# Patient Record
Sex: MALE | Race: WHITE | URBAN - METROPOLITAN AREA
[De-identification: names, ages, dates, MRNs, and addresses within clinical notes are randomized per-mention and may not be internally consistent; named-entity substitution may affect disease eponyms.]

---

## 2019-09-05 ENCOUNTER — DOCTOR'S OFFICE (OUTPATIENT)
Dept: URBAN - METROPOLITAN AREA CLINIC 125 | Facility: CLINIC | Age: 49
Setting detail: OPHTHALMOLOGY
End: 2019-09-05
Payer: COMMERCIAL

## 2019-09-05 DIAGNOSIS — H25.013: ICD-10-CM

## 2019-09-05 DIAGNOSIS — H11.123: ICD-10-CM

## 2019-09-05 DIAGNOSIS — H17.9: ICD-10-CM

## 2019-09-05 PROBLEM — H10.13 ALLERGIC CONJUNCTIVITS; BOTH EYES: Status: ACTIVE | Noted: 2019-09-05

## 2019-09-05 PROCEDURE — 92004 COMPRE OPH EXAM NEW PT 1/>: CPT | Performed by: OPHTHALMOLOGY

## 2019-09-05 ASSESSMENT — KERATOMETRY
OD_K2POWER_DIOPTERS: 45.75
OS_K2POWER_DIOPTERS: 45.50
OD_AXISANGLE_DEGREES: 176
OD_K1POWER_DIOPTERS: 44.50
OS_AXISANGLE_DEGREES: 001
OS_K1POWER_DIOPTERS: 44.75

## 2019-09-05 ASSESSMENT — REFRACTION_MANIFEST
OS_VA2: 20/
OD_VA3: 20/
OS_VA3: 20/
OS_VA1: 20/
OU_VA: 20/
OS_VA1: 20/
OS_VA2: 20/
OD_VA3: 20/
OD_VA1: 20/
OD_VA1: 20/
OD_VA2: 20/
OU_VA: 20/
OD_VA2: 20/
OS_VA3: 20/

## 2019-09-05 ASSESSMENT — AXIALLENGTH_DERIVED
OS_AL: 23.0098
OD_AL: 23.1028

## 2019-09-05 ASSESSMENT — REFRACTION_CURRENTRX
OD_OVR_VA: 20/
OS_OVR_VA: 20/

## 2019-09-05 ASSESSMENT — REFRACTION_AUTOREFRACTION
OS_SPHERE: +0.25
OS_AXIS: 028
OS_CYLINDER: -0.50
OD_SPHERE: 0.00
OD_AXIS: 145
OD_CYLINDER: -0.50

## 2019-09-05 ASSESSMENT — CONFRONTATIONAL VISUAL FIELD TEST (CVF)
OD_FINDINGS: FULL
OS_FINDINGS: FULL

## 2019-09-05 ASSESSMENT — VISUAL ACUITY
OS_BCVA: 20/20-2
OD_BCVA: 20/20

## 2019-09-05 ASSESSMENT — SPHEQUIV_DERIVED
OD_SPHEQUIV: -0.25
OS_SPHEQUIV: 0

## 2019-11-15 ENCOUNTER — APPOINTMENT (EMERGENCY)
Dept: CT IMAGING | Facility: HOSPITAL | Age: 49
End: 2019-11-15
Payer: COMMERCIAL

## 2019-11-15 ENCOUNTER — HOSPITAL ENCOUNTER (EMERGENCY)
Facility: HOSPITAL | Age: 49
Discharge: HOME | End: 2019-11-15
Attending: EMERGENCY MEDICINE | Admitting: EMERGENCY MEDICINE
Payer: COMMERCIAL

## 2019-11-15 ENCOUNTER — APPOINTMENT (EMERGENCY)
Dept: RADIOLOGY | Facility: HOSPITAL | Age: 49
End: 2019-11-15
Payer: COMMERCIAL

## 2019-11-15 VITALS
RESPIRATION RATE: 16 BRPM | DIASTOLIC BLOOD PRESSURE: 71 MMHG | OXYGEN SATURATION: 98 % | BODY MASS INDEX: 28.23 KG/M2 | SYSTOLIC BLOOD PRESSURE: 111 MMHG | TEMPERATURE: 97.8 F | HEART RATE: 71 BPM | HEIGHT: 74 IN | WEIGHT: 220 LBS

## 2019-11-15 DIAGNOSIS — R55 SYNCOPE: Primary | ICD-10-CM

## 2019-11-15 DIAGNOSIS — R31.9 HEMATURIA: ICD-10-CM

## 2019-11-15 LAB
ALBUMIN SERPL BCP-MCNC: 4.5 G/DL (ref 3.5–5.7)
ALP SERPL-CCNC: 50 U/L (ref 40–150)
ALT SERPL W P-5'-P-CCNC: 36 U/L (ref 7–52)
ANION GAP SERPL CALCULATED.3IONS-SCNC: 7 MMOL/L (ref 4–13)
AST SERPL W P-5'-P-CCNC: 22 U/L (ref 13–39)
ATRIAL RATE: 58 BPM
BACTERIA UR QL AUTO: ABNORMAL /HPF
BASOPHILS # BLD AUTO: 0 THOUSANDS/ΜL (ref 0–0.1)
BASOPHILS NFR BLD AUTO: 1 % (ref 0–2)
BILIRUB SERPL-MCNC: 0.8 MG/DL (ref 0.2–1)
BILIRUB UR QL STRIP: NEGATIVE
BUN SERPL-MCNC: 20 MG/DL (ref 7–25)
CALCIUM SERPL-MCNC: 9 MG/DL (ref 8.6–10.5)
CHLORIDE SERPL-SCNC: 104 MMOL/L (ref 98–107)
CLARITY UR: CLEAR
CO2 SERPL-SCNC: 29 MMOL/L (ref 21–31)
COLOR UR: YELLOW
CREAT SERPL-MCNC: 1.48 MG/DL (ref 0.7–1.3)
EOSINOPHIL # BLD AUTO: 0.1 THOUSAND/ΜL (ref 0–0.61)
EOSINOPHIL NFR BLD AUTO: 3 % (ref 0–5)
ERYTHROCYTE [DISTWIDTH] IN BLOOD BY AUTOMATED COUNT: 13.1 % (ref 11.5–14.5)
GFR SERPL CREATININE-BSD FRML MDRD: 56 ML/MIN/1.73SQ M
GLUCOSE SERPL-MCNC: 127 MG/DL (ref 65–99)
GLUCOSE UR STRIP-MCNC: NEGATIVE MG/DL
HCT VFR BLD AUTO: 47 % (ref 42–47)
HGB BLD-MCNC: 16.1 G/DL (ref 14–18)
HGB UR QL STRIP.AUTO: ABNORMAL
HYALINE CASTS #/AREA URNS LPF: ABNORMAL /LPF
KETONES UR STRIP-MCNC: NEGATIVE MG/DL
LEUKOCYTE ESTERASE UR QL STRIP: NEGATIVE
LIPASE SERPL-CCNC: 32 U/L (ref 11–82)
LYMPHOCYTES # BLD AUTO: 1.4 THOUSANDS/ΜL (ref 0.6–4.47)
LYMPHOCYTES NFR BLD AUTO: 35 % (ref 21–51)
MCH RBC QN AUTO: 30.8 PG (ref 26–34)
MCHC RBC AUTO-ENTMCNC: 34.3 G/DL (ref 31–37)
MCV RBC AUTO: 90 FL (ref 81–99)
MONOCYTES # BLD AUTO: 0.3 THOUSAND/ΜL (ref 0.17–1.22)
MONOCYTES NFR BLD AUTO: 8 % (ref 2–12)
MUCOUS THREADS UR QL AUTO: ABNORMAL
NEUTROPHILS # BLD AUTO: 2.1 THOUSANDS/ΜL (ref 1.4–6.5)
NEUTS SEG NFR BLD AUTO: 53 % (ref 42–75)
NITRITE UR QL STRIP: NEGATIVE
NON-SQ EPI CELLS URNS QL MICRO: ABNORMAL /HPF
P AXIS: 63 DEGREES
PH UR STRIP.AUTO: 5.5 [PH]
PLATELET # BLD AUTO: 119 THOUSANDS/UL (ref 149–390)
PMV BLD AUTO: 9.4 FL (ref 8.6–11.7)
POTASSIUM SERPL-SCNC: 4 MMOL/L (ref 3.5–5.5)
PR INTERVAL: 190 MS
PROT SERPL-MCNC: 6.4 G/DL (ref 6.4–8.9)
PROT UR STRIP-MCNC: NEGATIVE MG/DL
QRS AXIS: 58 DEGREES
QRSD INTERVAL: 92 MS
QT INTERVAL: 460 MS
QTC INTERVAL: 451 MS
RBC # BLD AUTO: 5.23 MILLION/UL (ref 4.3–5.9)
RBC #/AREA URNS AUTO: ABNORMAL /HPF
SODIUM SERPL-SCNC: 140 MMOL/L (ref 134–143)
SP GR UR STRIP.AUTO: >=1.03 (ref 1–1.03)
T WAVE AXIS: 63 DEGREES
TROPONIN I SERPL-MCNC: <0.03 NG/ML
UROBILINOGEN UR QL STRIP.AUTO: 0.2 E.U./DL
VENTRICULAR RATE: 58 BPM
WBC # BLD AUTO: 3.9 THOUSAND/UL (ref 4.8–10.8)
WBC #/AREA URNS AUTO: ABNORMAL /HPF

## 2019-11-15 PROCEDURE — 99285 EMERGENCY DEPT VISIT HI MDM: CPT

## 2019-11-15 PROCEDURE — 93010 ELECTROCARDIOGRAM REPORT: CPT | Performed by: INTERNAL MEDICINE

## 2019-11-15 PROCEDURE — 36415 COLL VENOUS BLD VENIPUNCTURE: CPT | Performed by: EMERGENCY MEDICINE

## 2019-11-15 PROCEDURE — 74176 CT ABD & PELVIS W/O CONTRAST: CPT

## 2019-11-15 PROCEDURE — 99285 EMERGENCY DEPT VISIT HI MDM: CPT | Performed by: EMERGENCY MEDICINE

## 2019-11-15 PROCEDURE — 81001 URINALYSIS AUTO W/SCOPE: CPT | Performed by: EMERGENCY MEDICINE

## 2019-11-15 PROCEDURE — 81003 URINALYSIS AUTO W/O SCOPE: CPT | Performed by: EMERGENCY MEDICINE

## 2019-11-15 PROCEDURE — 71046 X-RAY EXAM CHEST 2 VIEWS: CPT

## 2019-11-15 PROCEDURE — 84484 ASSAY OF TROPONIN QUANT: CPT | Performed by: EMERGENCY MEDICINE

## 2019-11-15 PROCEDURE — 85025 COMPLETE CBC W/AUTO DIFF WBC: CPT | Performed by: EMERGENCY MEDICINE

## 2019-11-15 PROCEDURE — 93005 ELECTROCARDIOGRAM TRACING: CPT

## 2019-11-15 PROCEDURE — 80053 COMPREHEN METABOLIC PANEL: CPT | Performed by: EMERGENCY MEDICINE

## 2019-11-15 PROCEDURE — 83690 ASSAY OF LIPASE: CPT | Performed by: EMERGENCY MEDICINE

## 2019-11-15 NOTE — ED PROVIDER NOTES
History  Chief Complaint   Patient presents with    Syncope     states woke this am with severe pain in scrotum and right back  He went to the bathroom and next he knew he woke in the bathroom on the floor  Patient is a 61-year-old male  Presents to the emergency room after an episode of syncope  He woke up this morning around 530 in the morning with pain in his right flank that radiated to his right scrotum  It was fairly severe  He went to the bathroom  While urinating he had a syncopal episode  He denies hematuria  There was no seizure  After about 5 to 10 minutes he was back to normal   He was diaphoretic  There was no injury  He was nauseated when he came to  No vomiting  No fever or chills  No chest pain, shortness of breath or palpitations  Patient does not have any cardiac history  The pain in the flank and scrotum has improved  Patient reports it is mild dull ache currently  Prior to Admission Medications   Prescriptions Last Dose Informant Patient Reported? Taking? PANTOPRAZOLE SODIUM PO   Yes Yes   Sig: Take by mouth      Facility-Administered Medications: None       Past Medical History:   Diagnosis Date    Hiatal hernia        History reviewed  No pertinent surgical history  History reviewed  No pertinent family history  I have reviewed and agree with the history as documented  Social History     Tobacco Use    Smoking status: Never Smoker    Smokeless tobacco: Never Used   Substance Use Topics    Alcohol use: Never     Frequency: Never    Drug use: Never        Review of Systems   Constitutional: Negative for chills and fever  HENT: Negative for rhinorrhea and sore throat  Eyes: Negative for pain, redness and visual disturbance  Respiratory: Negative for cough and shortness of breath  Cardiovascular: Negative for chest pain and leg swelling  Gastrointestinal: Positive for nausea  Negative for abdominal pain, diarrhea and vomiting     Endocrine: Negative for polydipsia and polyuria  Genitourinary: Positive for flank pain  Negative for dysuria, frequency and hematuria  Musculoskeletal: Positive for back pain  Negative for neck pain  Skin: Negative for rash and wound  Allergic/Immunologic: Negative for immunocompromised state  Neurological: Positive for syncope  Negative for weakness, numbness and headaches  Psychiatric/Behavioral: Negative for hallucinations and suicidal ideas  All other systems reviewed and are negative  Physical Exam  Physical Exam   Constitutional: He is oriented to person, place, and time  He appears well-developed and well-nourished  No distress  HENT:   Head: Normocephalic and atraumatic  Eyes: Pupils are equal, round, and reactive to light  Conjunctivae and EOM are normal  Right eye exhibits no discharge  Left eye exhibits no discharge  No scleral icterus  Neck: Normal range of motion  Neck supple  Cardiovascular: Normal rate, regular rhythm, normal heart sounds and intact distal pulses  Exam reveals no gallop and no friction rub  No murmur heard  Pulmonary/Chest: Effort normal and breath sounds normal  No respiratory distress  He has no wheezes  He has no rales  Abdominal: Soft  Bowel sounds are normal  He exhibits no distension  There is no tenderness  There is no rebound and no guarding  Genitourinary:   Genitourinary Comments: No hernia  No testicular tenderness  No scrotal swelling  Musculoskeletal: Normal range of motion  He exhibits no edema, tenderness or deformity  Neurological: He is alert and oriented to person, place, and time  He has normal strength  No sensory deficit  GCS eye subscore is 4  GCS verbal subscore is 5  GCS motor subscore is 6  Skin: Skin is warm and dry  No rash noted  He is not diaphoretic  Psychiatric: He has a normal mood and affect  His behavior is normal    Vitals reviewed        Vital Signs  ED Triage Vitals [11/15/19 0626]   Temperature Pulse Respirations Blood Pressure SpO2   97 8 °F (36 6 °C) 58 16 109/74 99 %      Temp src Heart Rate Source Patient Position - Orthostatic VS BP Location FiO2 (%)   -- -- -- -- --      Pain Score       No Pain           Vitals:    11/15/19 0626 11/15/19 0700 11/15/19 0730 11/15/19 1005   BP: 109/74 113/75 116/75 111/71   Pulse: 58 62 64 71         Visual Acuity      ED Medications  Medications - No data to display    Diagnostic Studies  Results Reviewed     Procedure Component Value Units Date/Time    Urine Microscopic [[id number]]  (Abnormal) Collected:  11/15/19 0857    Lab Status:  Final result Specimen:  Urine, Clean Catch Updated:  11/15/19 0917     RBC, UA 10-20 /hpf      WBC, UA 1-2 /hpf      Epithelial Cells Occasional /hpf      Bacteria, UA Occasional /hpf      Hyaline Casts, UA 2-4 /lpf      MUCUS THREADS Moderate    UA w Reflex to Microscopic w Reflex to Culture [[id number]]  (Abnormal) Collected:  11/15/19 0857    Lab Status:  Final result Specimen:  Urine, Clean Catch Updated:  11/15/19 0906     Color, UA Yellow     Clarity, UA Clear     Specific Gravity, UA >=1 030     pH, UA 5 5     Leukocytes, UA Negative     Nitrite, UA Negative     Protein, UA Negative mg/dl      Glucose, UA Negative mg/dl      Ketones, UA Negative mg/dl      Urobilinogen, UA 0 2 E U /dl      Bilirubin, UA Negative     Blood, UA 2+    Lipase [[id number]]  (Normal) Collected:  11/15/19 0647    Lab Status:  Final result Specimen:  Blood from Arm, Left Updated:  11/15/19 0848     Lipase 32 u/L     Troponin I [[id number]]  (Normal) Collected:  11/15/19 0647    Lab Status:  Final result Specimen:  Blood from Arm, Left Updated:  11/15/19 0719     Troponin I <0 03 ng/mL     Comprehensive metabolic panel [[id number]]  (Abnormal) Collected:  11/15/19 0647    Lab Status:  Final result Specimen:  Blood from Arm, Left Updated:  11/15/19 4916     Sodium 140 mmol/L      Potassium 4 0 mmol/L      Chloride 104 mmol/L      CO2 29 mmol/L      ANION GAP 7 mmol/L      BUN 20 mg/dL      Creatinine 1 48 mg/dL      Glucose 127 mg/dL      Calcium 9 0 mg/dL      AST 22 U/L      ALT 36 U/L      Alkaline Phosphatase 50 U/L      Total Protein 6 4 g/dL      Albumin 4 5 g/dL      Total Bilirubin 0 80 mg/dL      eGFR 56 ml/min/1 73sq m     Narrative:       [place] guidelines for Chronic Kidney Disease (CKD):     Stage 1 with normal or high GFR (GFR > 90 mL/min/1 73 square meters)    Stage 2 Mild CKD (GFR = 60-89 mL/min/1 73 square meters)    Stage 3A Moderate CKD (GFR = 45-59 mL/min/1 73 square meters)    Stage 3B Moderate CKD (GFR = 30-44 mL/min/1 73 square meters)    Stage 4 Severe CKD (GFR = 15-29 mL/min/1 73 square meters)    Stage 5 End Stage CKD (GFR <15 mL/min/1 73 square meters)  Note: GFR calculation is accurate only with a steady state creatinine    CBC and differential [[id number]]  (Abnormal) Collected:  11/15/19 0647    Lab Status:  Final result Specimen:  Blood from Arm, Left Updated:  11/15/19 0700     WBC 3 90 Thousand/uL      RBC 5 23 Million/uL      Hemoglobin 16 1 g/dL      Hematocrit 47 0 %      MCV 90 fL      MCH 30 8 pg      MCHC 34 3 g/dL      RDW 13 1 %      MPV 9 4 fL      Platelets 166 Thousands/uL      Neutrophils Relative 53 %      Lymphocytes Relative 35 %      Monocytes Relative 8 %      Eosinophils Relative 3 %      Basophils Relative 1 %      Neutrophils Absolute 2 10 Thousands/µL      Lymphocytes Absolute 1 40 Thousands/µL      Monocytes Absolute 0 30 Thousand/µL      Eosinophils Absolute 0 10 Thousand/µL      Basophils Absolute 0 00 Thousands/µL                  CT renal stone study abdomen pelvis without contrast   Final Result by Bk Vasquez MD (11/15 0901)      No urinary tract pathology visible  No acute findings  Severe chronic L5-S1 disc degeneration with bilateral L5-S1 neural foraminal narrowing               Workstation performed: [id number]         XR chest 2 views   ED Interpretation by Corrinne Ambrosia, MD (11/15 5911)   No infiltrates  No cardiomegaly  No widened mediastinum  Final Result by Bailey Rock MD (09/03 6141)      No acute cardiopulmonary disease  Workstation performed: [id number]                    Procedures  ECG 12 Lead Documentation Only  Date/Time: 11/15/2019 8:23 AM  Performed by: Cam Peñaloza MD  Authorized by: Cam Peñaloza MD     ECG reviewed by me, the ED Provider: yes    Patient location:  ED  Interpretation:     Interpretation: normal    Rate:     ECG rate assessment: bradycardic      ECG rate assessment comment: At 58 beats per minute  Rhythm:     Rhythm: sinus rhythm    Ectopy:     Ectopy: none    QRS:     QRS axis:  Normal  Conduction:     Conduction: normal    ST segments:     ST segments:  Normal  T waves:     T waves: normal             ED Course                               MDM  Number of Diagnoses or Management Options  Diagnosis management comments: EKG was normal   Laboratory evaluation was nonspecific  Doubt significant cardiac cause of syncope  Suspect kidney stone and syncope from pain  CT scan was negative for stone  However, there was hematuria  Perhaps patient had passed a stone  There was no AAA  Micturation syncope is also in the differential   Nevertheless, patient is appropriate for discharge and outpatient management of his syncope and hematuria         Amount and/or Complexity of Data Reviewed  Clinical lab tests: ordered and reviewed  Tests in the radiology section of CPT®: ordered and reviewed  Independent visualization of images, tracings, or specimens: yes        Disposition  Final diagnoses:   Syncope   Hematuria     Time reflects when diagnosis was documented in both MDM as applicable and the Disposition within this note     Time User Action Codes Description Comment    11/15/2019 10:02 AM Gibran Hargrove Add [R55] Syncope     11/15/2019 10:02 AM Gibran Hargrove Add [R31 9] Hematuria       ED Disposition     ED Disposition Condition Date/Time Comment    Discharge Stable Fri Nov 15, 2019 10:02 AM Leonardo Gaitan discharge to home/self care  Follow-up Information     Follow up With Specialties Details Why Contact Info    Kaylan Mckeon DO Internal Medicine In 2 days  [address] [place] [id number] [phone]            Discharge Medication List as of 11/15/2019 10:03 AM      CONTINUE these medications which have NOT CHANGED    Details   PANTOPRAZOLE SODIUM PO Take by mouth, Historical Med           No discharge procedures on file      ED Provider  Electronically Signed by           Twila Helms MD  11/15/19 MD Abby  11/15/19 8845

## 2019-12-11 ENCOUNTER — TRANSCRIBE ORDERS (OUTPATIENT)
Dept: ADMINISTRATIVE | Facility: HOSPITAL | Age: 49
End: 2019-12-11

## 2019-12-11 DIAGNOSIS — R55 SYNCOPE AND COLLAPSE: Primary | ICD-10-CM

## 2019-12-20 ENCOUNTER — HOSPITAL ENCOUNTER (OUTPATIENT)
Dept: NON INVASIVE DIAGNOSTICS | Facility: HOSPITAL | Age: 49
Discharge: HOME | End: 2019-12-20
Payer: COMMERCIAL

## 2019-12-20 DIAGNOSIS — R55 SYNCOPE AND COLLAPSE: ICD-10-CM

## 2019-12-20 PROCEDURE — 93306 TTE W/DOPPLER COMPLETE: CPT | Performed by: INTERNAL MEDICINE

## 2019-12-20 PROCEDURE — 93306 TTE W/DOPPLER COMPLETE: CPT

## 2020-03-19 ENCOUNTER — OFFICE VISIT (OUTPATIENT)
Dept: SLEEP CENTER | Facility: CLINIC | Age: 50
End: 2020-03-19
Payer: COMMERCIAL

## 2020-03-19 VITALS
BODY MASS INDEX: 29.39 KG/M2 | OXYGEN SATURATION: 98 % | HEART RATE: 82 BPM | WEIGHT: 229 LBS | HEIGHT: 74 IN | DIASTOLIC BLOOD PRESSURE: 70 MMHG | SYSTOLIC BLOOD PRESSURE: 118 MMHG

## 2020-03-19 DIAGNOSIS — G47.19 EXCESSIVE DAYTIME SLEEPINESS: ICD-10-CM

## 2020-03-19 DIAGNOSIS — G47.9 SLEEP DISTURBANCE: ICD-10-CM

## 2020-03-19 DIAGNOSIS — G47.33 OSA (OBSTRUCTIVE SLEEP APNEA): Primary | ICD-10-CM

## 2020-03-19 DIAGNOSIS — E66.9 OBESITY (BMI 30-39.9): ICD-10-CM

## 2020-03-19 DIAGNOSIS — R00.2 PALPITATIONS: ICD-10-CM

## 2020-03-19 DIAGNOSIS — F45.8 BRUXISM: ICD-10-CM

## 2020-03-19 DIAGNOSIS — R51.9 HEADACHE UPON AWAKENING: ICD-10-CM

## 2020-03-19 DIAGNOSIS — K21.9 GASTROESOPHAGEAL REFLUX DISEASE, ESOPHAGITIS PRESENCE NOT SPECIFIED: ICD-10-CM

## 2020-03-19 PROCEDURE — 99244 OFF/OP CNSLTJ NEW/EST MOD 40: CPT | Performed by: INTERNAL MEDICINE

## 2020-03-19 RX ORDER — PANTOPRAZOLE SODIUM 40 MG/1
40 TABLET, DELAYED RELEASE ORAL DAILY
COMMUNITY
Start: 2020-03-12

## 2020-03-19 NOTE — PATIENT INSTRUCTIONS
What is EVER? Obstructive sleep apnea is a common and serious sleep disorder that causes you to stop breathing during sleep  The airway repeatedly becomes blocked, limiting the amount of air that reaches your lungs  When this happens, you may snore loudly or making choking noises as you try to breathe  Your brain and body becomes oxygen deprived and you may wake up  This may happen a few times a night, or in more severe cases, several hundred times a night  Sleep apnea can make you wake up in the morning feeling tired or unrefreshed even though you have had a full night of sleep  During the day, you may feel fatigued, have difficulty concentrating or you may even unintentionally fall asleep  This is because your body is waking up numerous times throughout the night, even though you might not be conscious of each awakening  The lack of oxygen your body receives can have negative long-term consequences for your health  This includes:  High blood pressure  Heart disease  Irregular heart rhythms  Stroke  Pre-diabetes and diabetes  Depression    Testing  An objective evaluation of your sleep may be needed before your board certified sleep physician can make a diagnosis  Options include:   In-lab overnight sleep study  This type of sleep study requires you to stay overnight at a sleep center, in a bed that may resemble a hotel room  You will sleep with sensors hooked up to various parts of your body  These sensors record your brain waves, heartbeat, breathing and movement  An overnight sleep study also provides your doctor with the most complete information about your sleep  Learn more about an overnight sleep study      Home sleep apnea test  Some patients with high risk factors for obstructive sleep apnea and no other medical disorders may be candidates for a home sleep apnea test  The testing equipment differs in that it is less complicated than what is used in an overnight sleep study   As such, does not give all the data an in-lab will and if negative, may not mean you do not have the problem  Treatment for sleep apnea  includes using a continuous positive airway pressure (CPAP) machine to keep your airway open during sleep  A mask is placed over your nose and mouth, or just your nose  The mask is hooked to the CPAP machine that blows a gentle stream of air into the mask when you breathe  This helps keep your airway open so you can breathe more regularly  Extra oxygen may be given to you through the machine  You may be given a mouth device  It looks like a mouth guard or dental retainer and stops your tongue and mouth tissues from blocking your throat while you sleep  Surgery may be needed to remove extra tissues that block your mouth, throat, or nose  Manage sleep apnea:   Do not smoke  Nicotine and other chemicals in cigarettes and cigars can cause lung damage  Ask your healthcare provider for information if you currently smoke and need help to quit  E-cigarettes or smokeless tobacco still contain nicotine  Talk to your healthcare provider before you use these products  Do not drink alcohol or take sedative medicine before you go to sleep  Alcohol and sedatives can relax the muscles and tissues around your throat  This can block the airflow to your lungs  Maintain a healthy weight  Excess tissue around your throat may restrict your breathing  Ask your healthcare provider for information if you need to lose weight  Sleep on your side or use pillows designed to prevent sleep apnea  This prevents your tongue or other tissues from blocking your throat  You can also raise the head of your bed  Driving Safety  Refrain from driving when drowsy  Follow up with your healthcare provider as directed:  Write down your questions so you remember to ask them during your visits  Go to [employer] website for more information: Sleepeducation  org     What is EVER?    Obstructive sleep apnea is a common and serious sleep disorder that causes you to stop breathing during sleep  The airway repeatedly becomes blocked, limiting the amount of air that reaches your lungs  When this happens, you may snore loudly or making choking noises as you try to breathe  Your brain and body becomes oxygen deprived and you may wake up  This may happen a few times a night, or in more severe cases, several hundred times a night  Sleep apnea can make you wake up in the morning feeling tired or unrefreshed even though you have had a full night of sleep  During the day, you may feel fatigued, have difficulty concentrating or you may even unintentionally fall asleep  This is because your body is waking up numerous times throughout the night, even though you might not be conscious of each awakening  The lack of oxygen your body receives can have negative long-term consequences for your health  This includes:  High blood pressure  Heart disease  Irregular heart rhythms  Stroke  Pre-diabetes and diabetes  Depression    Testing  An objective evaluation of your sleep may be needed before your board certified sleep physician can make a diagnosis  Options include:   In-lab overnight sleep study  This type of sleep study requires you to stay overnight at a sleep center, in a bed that may resemble a hotel room  You will sleep with sensors hooked up to various parts of your body  These sensors record your brain waves, heartbeat, breathing and movement  An overnight sleep study also provides your doctor with the most complete information about your sleep  Learn more about an overnight sleep study      Home sleep apnea test  Some patients with high risk factors for obstructive sleep apnea and no other medical disorders may be candidates for a home sleep apnea test  The testing equipment differs in that it is less complicated than what is used in an overnight sleep study   As such, does not give all the data an in-lab will and if negative, may not mean you do not have the problem  Treatment for sleep apnea  includes using a continuous positive airway pressure (CPAP) machine to keep your airway open during sleep  A mask is placed over your nose and mouth, or just your nose  The mask is hooked to the CPAP machine that blows a gentle stream of air into the mask when you breathe  This helps keep your airway open so you can breathe more regularly  Extra oxygen may be given to you through the machine  You may be given a mouth device  It looks like a mouth guard or dental retainer and stops your tongue and mouth tissues from blocking your throat while you sleep  Surgery may be needed to remove extra tissues that block your mouth, throat, or nose  Manage sleep apnea:   Do not smoke  Nicotine and other chemicals in cigarettes and cigars can cause lung damage  Ask your healthcare provider for information if you currently smoke and need help to quit  E-cigarettes or smokeless tobacco still contain nicotine  Talk to your healthcare provider before you use these products  Do not drink alcohol or take sedative medicine before you go to sleep  Alcohol and sedatives can relax the muscles and tissues around your throat  This can block the airflow to your lungs  Maintain a healthy weight  Excess tissue around your throat may restrict your breathing  Ask your healthcare provider for information if you need to lose weight  Sleep on your side or use pillows designed to prevent sleep apnea  This prevents your tongue or other tissues from blocking your throat  You can also raise the head of your bed  Driving Safety  Refrain from driving when drowsy  Follow up with your healthcare provider as directed:  Write down your questions so you remember to ask them during your visits  Go to AAS website for more information: Sleepeducation  org       Nursing Support:  When: Monday through Friday 7A-5PM except holidays  Where: Our direct line is [phone]      If you are having a true emergency please call 911  In the event that the line is busy or it is after hours please leave a voice message and we will return your call  Please speak clearly, leaving your full name, birth date, best number to reach you and the reason for your call  Medication refills: We will need the name of the medication, the dosage, the ordering provider, whether you get a 30 or 90 day refill, and the pharmacy name and address  Medications will be ordered by the provider only  Nurses cannot call in prescriptions  Please allow 7 days for medication refills  Physician requested updates: If your provider requested that you call with an update after starting medication, please be ready to provide us the medication and dosage, what time you take your medication, the time you attempt to fall asleep, time you fall asleep, when you wake up, and what time you get out of bed  Sleep Study Results: We will contact you with sleep study results and/or next steps after the physician has reviewed your testing

## 2020-03-19 NOTE — PROGRESS NOTES
Consultation - [address]  54 y o  male  :1970  FMN:[id number]    Physician Requesting Consult: Argenis Juares DO     Reason for Consult : At your kind request I saw this patient for initial sleep evaluation today  The patient states he was recently diagnosed with cardiomyopathy and is now here to also evaluate for suspected Obstructive Sleep Apnea  PFSH, Problem List, Medications & Allergies were reviewed in EMR  He  has a past medical history of Hiatal hernia  He has a current medication list which includes the following prescription(s): pantoprazole  HPI:  He reports snoring for as long as I can remember  This disturbs his wife was also witnessed apneas  He is not aware of snoring or breathing difficulties during sleep  However, he also has acid reflux that is improved by elevating head of the bed  Other Complaints:  Tired irrespective of sleep  Restless Leg Syndrome: reports no suggestive symptoms but has sporadic lower extremity pain that does not affect sleep   Parasomnia: reports teeth grinding during sleep, but no other features of parasomnia   Sleep Routine:   Typical Bedtime:  11:00 p m  Gets OOB:  8:30 a m  TIB:9 5 hrs  Sleep latency:< 15 minutes Sleep Interruptions:3/nite and is able to fall back asleep  Awakens: spontaneously  Upon awakening: rarely feels refreshed   He awakens with headache around once a week  He has Excessive Daytime Sleepiness, feels like napping during the day and regularly dozes off when sedentary at home in the evenings  [place] Sleepiness Scale rated at Total score: 17 /24  Habits: reports that he has never smoked  He has never used smokeless tobacco  , reports that he does not drink alcohol  ,  reports that he does not use drugs  ,Caffeine use:excessive all day, Exercise routine: none   Family History:  Both parents have obstructive sleep apnea  ROS: reviewed & as attached   Significant for steady weight gain over the years  He reported no nasal or respiratory symptoms  He has episodic palpitations  He has feelings of depression  EXAM:  /70 (BP Location: Left arm, Cuff Size: Large)   Pulse 82   Ht 6' 2" (1 88 m)   Wt 104 kg (229 lb)   SpO2 98%   BMI 29 40 kg/m²    General: Well groomed male, well appearing, in no apparent distress  Psychiatric: Alert and orientated; Cooperative; Speech:clear and coherent; constricted affect  HEENT:  Craniofacial anatomy: normal Sinuses: non- tender  TMJ: Normal    Eyes: EOM's intact;  conjunctiva/corneas clear   Ears: Externallyappear normal     Nasal Airway: assymetric nares and assymetric nose Septum:intact; Mucosa: normal; Turbinates: normal; Rhinorrhea: None   Mouth: Lips: normal posture; Dentition: worn down  Mucosa:moist  ; Hard Palate:high arched and prominent maxillary torus   Oropharryx: crowded and AP narrowing Tongue: Mallampati:Class IV, Mobile and ScallopedSoft Palate:  redundant  Tonsils: no hypertrophy  Neck: Neck Circumference: 17 "; Supple; no abnormal masses; Thyroid:normal  Trachea:central     Lymph: No Cervical or Submandibular Lymhadenopathy  Heart: S1,S2 normal; RRR; no gallop; nomurmurs   Lungs: Respiratory Effort:normal  Air entry good bilaterally  No wheezes  No rales  Abdomen: Obese, Soft & non-tender    Extremities: No pedal edema  No clubbing or cyanosis  Skin: warm and dry; Color& Hydration good; no facial rashes or lesions   Neurological: CNII-XII intact; Motor normal; Sensation normal  Musculoskeletal: Muscle bulk, tone and power WNL Gait:normal        IMPRESSION: Primary, Secondary Sleep Diagnoses (to Medical or Psych conditions) & Comorbidities   1  EVER (obstructive sleep apnea)  Diagnostic Sleep Study    CPAP Study   2  Sleep disturbance  Diagnostic Sleep Study   3  Headache upon awakening     4  Excessive daytime sleepiness  Diagnostic Sleep Study   5  Bruxism     6  Palpitations     7   Gastroesophageal reflux disease, esophagitis presence not specified     8  Obesity (BMI 30-39  9)          PLAN:   1  Comprehensive counseling was provided on pathophysiology, diagnostic strategies & treatment options; effects on symptoms and comorbidities; risks of inadequate therapy; costs and insurance aspects  2  I advised on weight reduction, avoiding sleeping supine, using alcohol or sedating medications close to bed time and on safe driving practices  3  Nocturnal polysomnography is indicated and a diagnostic study will be scheduled  4  Patient is willing to try Positive airway pressure therapy and will be scheduled for a titration study if indicated  5  Follow-up will be scheduled after the studies to review results, further details of treatment options and to initiate/adjust therapy  Thank you for allowing me to participate in the care of this patient  I will keep you apprised of developments            Sincerely,     Authenticated electronically by Yanira Chaudhari MD   on 96/28/63   Board Certified Specialist

## 2020-03-19 NOTE — PROGRESS NOTES
Review of Systems      Genitourinary need to urinate more than twice a night   Cardiology palpitations/fluttering feeling in the chest and ankle/leg swelling   Gastrointestinal frequent heartburn/acid reflux   Neurology frequent headaches, awaken with headache and numbness/tingling of an extremity   Constitutional fatigue   Integumentary rash or dry skin   Psychiatry depression, aggressiveness or irritability and mood change   Musculoskeletal joint pain and leg cramps   Pulmonary snoring   ENT ringing in ears   Endocrine frequent urination   Hematological none

## 2020-04-10 ENCOUNTER — TRANSCRIBE ORDERS (OUTPATIENT)
Dept: ADMINISTRATIVE | Facility: HOSPITAL | Age: 50
End: 2020-04-10

## 2020-04-10 DIAGNOSIS — Z87.442 PERSONAL HISTORY OF KIDNEY STONES: Primary | ICD-10-CM

## 2020-05-22 ENCOUNTER — TRANSCRIBE ORDERS (OUTPATIENT)
Dept: SLEEP CENTER | Facility: CLINIC | Age: 50
End: 2020-05-22

## 2020-05-22 DIAGNOSIS — R06.83 SNORING: Primary | ICD-10-CM

## 2020-05-22 DIAGNOSIS — R93.1 ABNORMAL FINDINGS ON DIAGNOSTIC IMAGING OF HEART AND CORONARY CIRCULATION: ICD-10-CM

## 2020-06-26 ENCOUNTER — HOSPITAL ENCOUNTER (OUTPATIENT)
Dept: SLEEP CENTER | Facility: HOSPITAL | Age: 50
Discharge: HOME | End: 2020-06-26
Attending: INTERNAL MEDICINE
Payer: COMMERCIAL

## 2020-06-26 DIAGNOSIS — G47.19 EXCESSIVE DAYTIME SLEEPINESS: ICD-10-CM

## 2020-06-26 DIAGNOSIS — G47.9 SLEEP DISTURBANCE: ICD-10-CM

## 2020-06-26 DIAGNOSIS — G47.33 OSA (OBSTRUCTIVE SLEEP APNEA): ICD-10-CM

## 2020-06-26 PROCEDURE — 95810 POLYSOM 6/> YRS 4/> PARAM: CPT

## 2020-07-02 ENCOUNTER — TELEPHONE (OUTPATIENT)
Dept: SLEEP CENTER | Facility: CLINIC | Age: 50
End: 2020-07-02

## 2020-07-02 NOTE — TELEPHONE ENCOUNTER
Advised patient sleep study shows no EVER   Patient to follow up with Dr Melissa Vazquez at July 9 appointment

## 2020-07-09 ENCOUNTER — OFFICE VISIT (OUTPATIENT)
Dept: SLEEP CENTER | Facility: CLINIC | Age: 50
End: 2020-07-09
Payer: COMMERCIAL

## 2020-07-09 VITALS
HEIGHT: 74 IN | TEMPERATURE: 97.5 F | SYSTOLIC BLOOD PRESSURE: 110 MMHG | DIASTOLIC BLOOD PRESSURE: 70 MMHG | HEART RATE: 69 BPM | OXYGEN SATURATION: 96 % | WEIGHT: 228 LBS | BODY MASS INDEX: 29.26 KG/M2

## 2020-07-09 DIAGNOSIS — F45.8 BRUXISM: ICD-10-CM

## 2020-07-09 DIAGNOSIS — F41.9 ANXIETY AND DEPRESSION: ICD-10-CM

## 2020-07-09 DIAGNOSIS — K21.9 GASTROESOPHAGEAL REFLUX DISEASE, ESOPHAGITIS PRESENCE NOT SPECIFIED: ICD-10-CM

## 2020-07-09 DIAGNOSIS — R00.2 PALPITATIONS: ICD-10-CM

## 2020-07-09 DIAGNOSIS — F32.A ANXIETY AND DEPRESSION: ICD-10-CM

## 2020-07-09 DIAGNOSIS — G47.9 SLEEP DISTURBANCE: ICD-10-CM

## 2020-07-09 DIAGNOSIS — E66.9 OBESITY (BMI 30-39.9): ICD-10-CM

## 2020-07-09 DIAGNOSIS — R51.9 HEADACHE UPON AWAKENING: ICD-10-CM

## 2020-07-09 DIAGNOSIS — G47.19 EXCESSIVE DAYTIME SLEEPINESS: ICD-10-CM

## 2020-07-09 DIAGNOSIS — G47.8 UPPER AIRWAY RESISTANCE SYNDROME: Primary | ICD-10-CM

## 2020-07-09 PROCEDURE — 99214 OFFICE O/P EST MOD 30 MIN: CPT | Performed by: INTERNAL MEDICINE

## 2020-07-09 NOTE — PROGRESS NOTES
Review of Systems      Genitourinary none   Cardiology palpitations/fluttering feeling in the chest and ankle/leg swelling   Gastrointestinal frequent heartburn/acid reflux   Neurology frequent headaches, awaken with headache, need to move extremities, numbness/tingling of an extremity and forgetfulness   Constitutional fatigue and weight change   Integumentary rash or dry skin and itching   Psychiatry depression and aggressiveness or irritability   Musculoskeletal joint pain, muscle aches and leg cramps   Pulmonary chest tightness and snoring   ENT ringing in ears   Endocrine frequent urination   Hematological none

## 2020-07-09 NOTE — PROGRESS NOTES
Follow-up Note - Sleep Center   Winsome Ferreira  54 y o  male  :1970  DYT:[id number]    I saw Nelda Bellamy in sleep clinic today for his sleep complaints & comorbidities  Patient recently had a diagnostic sleep study and is here to review results / treatment options  The study demonstrated no evidence for obstructive sleep apnea: AHI 0 /hour     Intermittent  snoring  was noted and there were 19 respiratory effort-related arousals resulting in an RDI of 3 3 per hour  Minimum oxygen saturation 89 %  Sleep latency was prolonged at 37 minutes  Sleep architecture was normal     PFSH, Problem List, Medications & Allergies were reviewed in EMR  Interval changes: none reported  He  has a past medical history of Hiatal hernia  He has a current medication list which includes the following prescription(s): pantoprazole and sertraline  ROS: constitutional, psychiatric, ENT, respiratory,CVS, GI, UGS, CNS, MSK, integumentary, endocrine, hematological reviewed - as attached  HPI:  He has ongoing symptoms as outlined in his initial report   Sleep Routine:  He is getting 7-8 hours of interrupted sleep  However, he falls asleep in about 30 minutes and when sleep is interrupted is able to fall back asleep  He awakens spontaneously but is not refreshed  [He has excessive drowsiness and may nap up to several hours during the day  He rated himself at Total score: 17 /24 on the [place] sleepiness scale ]        Habits:  reports that he has never smoked  He has never used smokeless tobacco  He reports that he does not drink alcohol or use drugs  He uses limited amount of caffeine  He exercises irregularly  EXAM: /70 (BP Location: Left arm, Cuff Size: Large)   Pulse 69   Temp 97 5 °F (36 4 °C) (Tympanic)   Ht 6' 2" (1 88 m)   Wt 103 kg (228 lb)   SpO2 96%   BMI 29 27 kg/m²     Patient is well groomed; well appearing  Skin/Extrem: warm & dry; col & hydration normal; no edema  Psych: cooperativeand in no distress  Mental state appears anxious and constricted affect  CNS: Alert, orientated, clear & coherent speech  H&N: EOMI; NC/AT:no facial pressure marks, no rashes  Neck Circumference: 17 cm  ENMT Mucosa appearsnormal Nasal airway:patent  Oral airway:  crowded  Resp:effort is normal CVS: RRR ABD: truncal obesity MSK:Gait normal     IMPRESSION: Primary Sleep/Secondary(to Medical or Psych conditions) & comorbidities   1  Upper airway resistance syndrome     2  Sleep disturbance     3  Excessive daytime sleepiness     4  Headache upon awakening     5  Bruxism     6  Anxiety and depression     7  Gastroesophageal reflux disease, esophagitis presence not specified     8  Obesity (BMI 30-39 9)     9  Palpitations         PLAN:    1  I reviewed results of the Sleep studies with the patient  2  With respect to above conditions, I counseled on pathophysiology, diagnosis, treatment options, risks and benefits; inter-relationship and effects on symptoms and comorbidities; risks of no treatment; costs and insurance aspects  3  For his mild sleep disordered breathing I advised weight reduction and positional therapy  If snoring persists, a mandibular advancement device or upper airway surgery may be considered  A repeat diagnostic study may be considered in future if symptoms escalate  4  He feels symptoms have improved somewhat since he was started on Zoloft  He may benefit from dose increase  5  I have not scheduled any follow-up in Sleep Clinic at this time  Thank you for allowing me to participate in the care of this patient  Please feel free to call me if I can be of any further assistance             Sincerely,    Authenticated electronically by Kay Mendoza MD on 51/53/29   Board Certified Specialist

## 2020-07-09 NOTE — PATIENT INSTRUCTIONS

## 2021-04-05 ENCOUNTER — TRANSCRIBE ORDERS (OUTPATIENT)
Dept: ADMINISTRATIVE | Facility: HOSPITAL | Age: 51
End: 2021-04-05

## 2021-04-22 DIAGNOSIS — R39.198 OTHER DIFFICULTIES WITH MICTURITION: ICD-10-CM

## 2021-04-22 DIAGNOSIS — N18.31 CHRONIC KIDNEY DISEASE, STAGE 3A (HCC): ICD-10-CM

## 2021-04-22 DIAGNOSIS — R10.9 UNSPECIFIED ABDOMINAL PAIN: ICD-10-CM

## 2021-04-29 ENCOUNTER — HOSPITAL ENCOUNTER (OUTPATIENT)
Dept: ULTRASOUND IMAGING | Facility: HOSPITAL | Age: 51
Discharge: HOME | End: 2021-04-29
Payer: COMMERCIAL

## 2021-04-29 DIAGNOSIS — N18.31 CHRONIC KIDNEY DISEASE, STAGE 3A (HCC): ICD-10-CM

## 2021-04-29 PROCEDURE — 76770 US EXAM ABDO BACK WALL COMP: CPT

## 2021-08-01 ENCOUNTER — APPOINTMENT (EMERGENCY)
Dept: CT IMAGING | Facility: HOSPITAL | Age: 51
End: 2021-08-01
Payer: COMMERCIAL

## 2021-08-01 ENCOUNTER — OFFICE VISIT (OUTPATIENT)
Dept: URGENT CARE | Facility: CLINIC | Age: 51
End: 2021-08-01
Payer: COMMERCIAL

## 2021-08-01 ENCOUNTER — APPOINTMENT (EMERGENCY)
Dept: RADIOLOGY | Facility: HOSPITAL | Age: 51
End: 2021-08-01
Payer: COMMERCIAL

## 2021-08-01 ENCOUNTER — HOSPITAL ENCOUNTER (EMERGENCY)
Facility: HOSPITAL | Age: 51
Discharge: HOME | End: 2021-08-01
Attending: EMERGENCY MEDICINE | Admitting: EMERGENCY MEDICINE
Payer: COMMERCIAL

## 2021-08-01 VITALS
TEMPERATURE: 99.9 F | HEART RATE: 80 BPM | SYSTOLIC BLOOD PRESSURE: 129 MMHG | OXYGEN SATURATION: 98 % | DIASTOLIC BLOOD PRESSURE: 78 MMHG | BODY MASS INDEX: 29.52 KG/M2 | HEIGHT: 74 IN | RESPIRATION RATE: 20 BRPM | WEIGHT: 230 LBS

## 2021-08-01 VITALS
RESPIRATION RATE: 18 BRPM | SYSTOLIC BLOOD PRESSURE: 105 MMHG | OXYGEN SATURATION: 98 % | TEMPERATURE: 100 F | HEIGHT: 74 IN | WEIGHT: 230 LBS | HEART RATE: 103 BPM | DIASTOLIC BLOOD PRESSURE: 57 MMHG | BODY MASS INDEX: 29.52 KG/M2

## 2021-08-01 DIAGNOSIS — M54.9 ACUTE RIGHT-SIDED BACK PAIN, UNSPECIFIED BACK LOCATION: ICD-10-CM

## 2021-08-01 DIAGNOSIS — R11.10 VOMITING, INTRACTABILITY OF VOMITING NOT SPECIFIED, PRESENCE OF NAUSEA NOT SPECIFIED, UNSPECIFIED VOMITING TYPE: ICD-10-CM

## 2021-08-01 DIAGNOSIS — M54.50 RIGHT LOW BACK PAIN: Primary | ICD-10-CM

## 2021-08-01 DIAGNOSIS — R68.83 CHILLS: ICD-10-CM

## 2021-08-01 DIAGNOSIS — R61 DIAPHORESIS: Primary | ICD-10-CM

## 2021-08-01 DIAGNOSIS — R50.9 FEVER, UNSPECIFIED FEVER CAUSE: ICD-10-CM

## 2021-08-01 DIAGNOSIS — N18.31 STAGE 3A CHRONIC KIDNEY DISEASE (HCC): ICD-10-CM

## 2021-08-01 PROBLEM — K22.70 BARRETT'S ESOPHAGUS WITHOUT DYSPLASIA: Status: ACTIVE | Noted: 2019-10-30

## 2021-08-01 PROBLEM — F32.1 CURRENT MODERATE EPISODE OF MAJOR DEPRESSIVE DISORDER WITHOUT PRIOR EPISODE (HCC): Status: ACTIVE | Noted: 2020-08-24

## 2021-08-01 PROBLEM — Z87.442 PERSONAL HISTORY OF KIDNEY STONES: Status: ACTIVE | Noted: 2021-06-09

## 2021-08-01 PROBLEM — I47.1 SVT (SUPRAVENTRICULAR TACHYCARDIA) (HCC): Status: ACTIVE | Noted: 2020-07-31

## 2021-08-01 PROBLEM — Z83.518: Status: ACTIVE | Noted: 2019-05-23

## 2021-08-01 PROBLEM — I47.10 SVT (SUPRAVENTRICULAR TACHYCARDIA) (HCC): Status: ACTIVE | Noted: 2020-07-31

## 2021-08-01 PROBLEM — Z87.442 HISTORY OF NEPHROLITHIASIS: Status: ACTIVE | Noted: 2020-06-04

## 2021-08-01 LAB
ALBUMIN SERPL BCP-MCNC: 4.4 G/DL (ref 3.5–5.7)
ALP SERPL-CCNC: 60 U/L (ref 40–150)
ALT SERPL W P-5'-P-CCNC: 37 U/L (ref 7–52)
ANION GAP SERPL CALCULATED.3IONS-SCNC: 12 MMOL/L (ref 4–13)
APTT PPP: 30 SECONDS (ref 23–37)
AST SERPL W P-5'-P-CCNC: 29 U/L (ref 13–39)
BASOPHILS # BLD AUTO: 0 THOUSANDS/ΜL (ref 0–0.1)
BASOPHILS NFR BLD AUTO: 0 % (ref 0–2)
BILIRUB SERPL-MCNC: 0.9 MG/DL (ref 0.2–1)
BILIRUB UR QL STRIP: ABNORMAL
BUN SERPL-MCNC: 20 MG/DL (ref 7–25)
CALCIUM SERPL-MCNC: 8.7 MG/DL (ref 8.6–10.5)
CHLORIDE SERPL-SCNC: 101 MMOL/L (ref 98–107)
CLARITY UR: CLEAR
CO2 SERPL-SCNC: 24 MMOL/L (ref 21–31)
COLOR UR: YELLOW
CREAT SERPL-MCNC: 1.47 MG/DL (ref 0.7–1.3)
EOSINOPHIL # BLD AUTO: 0 THOUSAND/ΜL (ref 0–0.61)
EOSINOPHIL NFR BLD AUTO: 0 % (ref 0–5)
ERYTHROCYTE [DISTWIDTH] IN BLOOD BY AUTOMATED COUNT: 13.2 % (ref 11.5–14.5)
GFR SERPL CREATININE-BSD FRML MDRD: 56 ML/MIN/1.73SQ M
GLUCOSE SERPL-MCNC: 158 MG/DL (ref 65–99)
GLUCOSE UR STRIP-MCNC: NEGATIVE MG/DL
HCT VFR BLD AUTO: 46.4 % (ref 42–47)
HGB BLD-MCNC: 16 G/DL (ref 14–18)
HGB UR QL STRIP.AUTO: NEGATIVE
INR PPP: 1.09 (ref 0.84–1.19)
KETONES UR STRIP-MCNC: ABNORMAL MG/DL
LACTATE SERPL-SCNC: 1 MMOL/L (ref 0.5–2)
LEUKOCYTE ESTERASE UR QL STRIP: NEGATIVE
LYMPHOCYTES # BLD AUTO: 0.3 THOUSANDS/ΜL (ref 0.6–4.47)
LYMPHOCYTES NFR BLD AUTO: 8 % (ref 21–51)
MCH RBC QN AUTO: 30.7 PG (ref 26–34)
MCHC RBC AUTO-ENTMCNC: 34.5 G/DL (ref 31–37)
MCV RBC AUTO: 89 FL (ref 81–99)
MONOCYTES # BLD AUTO: 0.3 THOUSAND/ΜL (ref 0.17–1.22)
MONOCYTES NFR BLD AUTO: 7 % (ref 2–12)
NEUTROPHILS # BLD AUTO: 3.2 THOUSANDS/ΜL (ref 1.4–6.5)
NEUTS SEG NFR BLD AUTO: 85 % (ref 42–75)
NITRITE UR QL STRIP: NEGATIVE
PH UR STRIP.AUTO: 6 [PH]
PLATELET # BLD AUTO: 98 THOUSANDS/UL (ref 149–390)
PLATELET BLD QL SMEAR: ABNORMAL
PMV BLD AUTO: 10.3 FL (ref 8.6–11.7)
POTASSIUM SERPL-SCNC: 3.6 MMOL/L (ref 3.5–5.5)
PROT SERPL-MCNC: 6.9 G/DL (ref 6.4–8.9)
PROT UR STRIP-MCNC: NEGATIVE MG/DL
PROTHROMBIN TIME: 14 SECONDS (ref 11.6–14.5)
RBC # BLD AUTO: 5.21 MILLION/UL (ref 4.3–5.9)
RBC MORPH BLD: NORMAL
SARS-COV-2 RNA RESP QL NAA+PROBE: NEGATIVE
SODIUM SERPL-SCNC: 137 MMOL/L (ref 134–143)
SP GR UR STRIP.AUTO: >=1.03 (ref 1–1.03)
UROBILINOGEN UR QL STRIP.AUTO: 0.2 E.U./DL
WBC # BLD AUTO: 3.8 THOUSAND/UL (ref 4.8–10.8)

## 2021-08-01 PROCEDURE — 85730 THROMBOPLASTIN TIME PARTIAL: CPT | Performed by: PHYSICIAN ASSISTANT

## 2021-08-01 PROCEDURE — 74177 CT ABD & PELVIS W/CONTRAST: CPT

## 2021-08-01 PROCEDURE — 96375 TX/PRO/DX INJ NEW DRUG ADDON: CPT

## 2021-08-01 PROCEDURE — 99203 OFFICE O/P NEW LOW 30 MIN: CPT | Performed by: NURSE PRACTITIONER

## 2021-08-01 PROCEDURE — 96374 THER/PROPH/DIAG INJ IV PUSH: CPT

## 2021-08-01 PROCEDURE — 96365 THER/PROPH/DIAG IV INF INIT: CPT

## 2021-08-01 PROCEDURE — 81003 URINALYSIS AUTO W/O SCOPE: CPT | Performed by: PHYSICIAN ASSISTANT

## 2021-08-01 PROCEDURE — 36415 COLL VENOUS BLD VENIPUNCTURE: CPT | Performed by: PHYSICIAN ASSISTANT

## 2021-08-01 PROCEDURE — 99285 EMERGENCY DEPT VISIT HI MDM: CPT | Performed by: PHYSICIAN ASSISTANT

## 2021-08-01 PROCEDURE — 85610 PROTHROMBIN TIME: CPT | Performed by: PHYSICIAN ASSISTANT

## 2021-08-01 PROCEDURE — 71045 X-RAY EXAM CHEST 1 VIEW: CPT

## 2021-08-01 PROCEDURE — 93005 ELECTROCARDIOGRAM TRACING: CPT

## 2021-08-01 PROCEDURE — 85025 COMPLETE CBC W/AUTO DIFF WBC: CPT | Performed by: PHYSICIAN ASSISTANT

## 2021-08-01 PROCEDURE — 87040 BLOOD CULTURE FOR BACTERIA: CPT | Performed by: PHYSICIAN ASSISTANT

## 2021-08-01 PROCEDURE — U0005 INFEC AGEN DETEC AMPLI PROBE: HCPCS | Performed by: PHYSICIAN ASSISTANT

## 2021-08-01 PROCEDURE — 84145 PROCALCITONIN (PCT): CPT | Performed by: PHYSICIAN ASSISTANT

## 2021-08-01 PROCEDURE — 83605 ASSAY OF LACTIC ACID: CPT | Performed by: PHYSICIAN ASSISTANT

## 2021-08-01 PROCEDURE — 80053 COMPREHEN METABOLIC PANEL: CPT | Performed by: PHYSICIAN ASSISTANT

## 2021-08-01 PROCEDURE — U0003 INFECTIOUS AGENT DETECTION BY NUCLEIC ACID (DNA OR RNA); SEVERE ACUTE RESPIRATORY SYNDROME CORONAVIRUS 2 (SARS-COV-2) (CORONAVIRUS DISEASE [COVID-19]), AMPLIFIED PROBE TECHNIQUE, MAKING USE OF HIGH THROUGHPUT TECHNOLOGIES AS DESCRIBED BY CMS-2020-01-R: HCPCS | Performed by: PHYSICIAN ASSISTANT

## 2021-08-01 PROCEDURE — 99285 EMERGENCY DEPT VISIT HI MDM: CPT

## 2021-08-01 RX ORDER — METHOCARBAMOL 500 MG/1
500 TABLET, FILM COATED ORAL 2 TIMES DAILY
Qty: 20 TABLET | Refills: 0 | Status: SHIPPED | OUTPATIENT
Start: 2021-08-01

## 2021-08-01 RX ORDER — BUPROPION HYDROCHLORIDE 200 MG/1
200 TABLET, EXTENDED RELEASE ORAL
COMMUNITY
Start: 2021-03-12

## 2021-08-01 RX ORDER — DEXAMETHASONE SODIUM PHOSPHATE 10 MG/ML
10 INJECTION, SOLUTION INTRAMUSCULAR; INTRAVENOUS ONCE
Status: COMPLETED | OUTPATIENT
Start: 2021-08-01 | End: 2021-08-01

## 2021-08-01 RX ORDER — SODIUM CHLORIDE, SODIUM GLUCONATE, SODIUM ACETATE, POTASSIUM CHLORIDE, MAGNESIUM CHLORIDE, SODIUM PHOSPHATE, DIBASIC, AND POTASSIUM PHOSPHATE .53; .5; .37; .037; .03; .012; .00082 G/100ML; G/100ML; G/100ML; G/100ML; G/100ML; G/100ML; G/100ML
1000 INJECTION, SOLUTION INTRAVENOUS ONCE
Status: COMPLETED | OUTPATIENT
Start: 2021-08-01 | End: 2021-08-01

## 2021-08-01 RX ORDER — PANTOPRAZOLE SODIUM 20 MG/1
20 TABLET, DELAYED RELEASE ORAL DAILY
COMMUNITY
Start: 2021-07-22

## 2021-08-01 RX ORDER — ONDANSETRON 2 MG/ML
4 INJECTION INTRAMUSCULAR; INTRAVENOUS ONCE
Status: COMPLETED | OUTPATIENT
Start: 2021-08-01 | End: 2021-08-01

## 2021-08-01 RX ORDER — LIDOCAINE 4 G/G
1 PATCH TOPICAL EVERY 12 HOURS
Qty: 10 PATCH | Refills: 0 | Status: SHIPPED | OUTPATIENT
Start: 2021-08-01

## 2021-08-01 RX ADMIN — SODIUM CHLORIDE, SODIUM GLUCONATE, SODIUM ACETATE, POTASSIUM CHLORIDE, MAGNESIUM CHLORIDE, SODIUM PHOSPHATE, DIBASIC, AND POTASSIUM PHOSPHATE 1000 ML: .53; .5; .37; .037; .03; .012; .00082 INJECTION, SOLUTION INTRAVENOUS at 17:30

## 2021-08-01 RX ADMIN — IOHEXOL 100 ML: 350 INJECTION, SOLUTION INTRAVENOUS at 18:09

## 2021-08-01 RX ADMIN — DEXAMETHASONE SODIUM PHOSPHATE 10 MG: 10 INJECTION, SOLUTION INTRAMUSCULAR; INTRAVENOUS at 19:02

## 2021-08-01 RX ADMIN — ONDANSETRON 4 MG: 2 INJECTION INTRAMUSCULAR; INTRAVENOUS at 17:29

## 2021-08-01 NOTE — PROGRESS NOTES
[facility]        NAME: Ghazala Jha is a 52 y o  male  : 1970    MRN: [id number]  DATE: 2021  TIME: 4:23 PM      Assessment and Plan     Diaphoresis [R61]  1  Diaphoresis     2  Fever, unspecified fever cause     3  Vomiting, intractability of vomiting not specified, presence of nausea not specified, unspecified vomiting type     4  Chills     5  Stage 3a chronic kidney disease ([facility] [address] )     6  Acute right-sided back pain, unspecified back location       Patient to ER via 911  Report called to ER  Patient Instructions   There are no Patient Instructions on file for this visit  Follow up with PCP in 3-5 days  Proceed to  ER if symptoms worsen  Chief Complaint     Chief Complaint   Patient presents with    Back Pain     right sided back pain radiating to right groin for 3 weeks, worsened yesterday  Chills, fever for 2 days         History of Present Illness     Patient presents c/o symptoms (to nurse) worsening over the last 3 weeks, much worse yesterday--right sided back pain with radiation to the right groin  Onset of chills and fever 2 days ago  On entering exam, patient is gray, diaphoretic and dripping sweat, vomiting repeatedly--minimally able to answer questions  After 911 was called, wife joins him in exam room, sharing he works physically but has had this pain consistently for 3 weeks  Also has had decreased fluid intake--she is concerned about dehydration  Recent call to nephrology mid July expressed concern about unplanned weight loss  Review of Systems     Review of Systems   Constitutional: Positive for chills, diaphoresis and fever  Gastrointestinal: Positive for nausea and vomiting  Genitourinary: Positive for flank pain  Negative for dysuria, frequency and hematuria  All other systems reviewed and are negative          Current Medications       Current Outpatient Medications:     buPROPion (WELLBUTRIN SR) 200 MG 12 hr tablet, Take 200 mg by mouth, Disp: , Rfl:     pantoprazole (PROTONIX) 20 mg tablet, Take 20 mg by mouth daily, Disp: , Rfl:     pantoprazole (PROTONIX) 40 mg tablet, Take 40 mg by mouth daily (Patient not taking: Reported on 8/1/2021), Disp: , Rfl:     sertraline (ZOLOFT) 50 mg tablet, Take 50 mg by mouth (Patient not taking: Reported on 8/1/2021), Disp: , Rfl:     Current Allergies     Allergies as of 08/01/2021 - Reviewed 08/01/2021   Allergen Reaction Noted    Amoxicillin Hives 12/28/2020              The following portions of the patient's history were reviewed and updated as appropriate: allergies, current medications, past family history, past medical history, past social history, past surgical history and problem list      Past Medical History:   Diagnosis Date    Hiatal hernia        History reviewed  No pertinent surgical history  History reviewed  No pertinent family history  Medications have been verified  Objective     /57   Pulse 103   Temp 100 °F (37 8 °C) (Temporal)   Resp 18   Ht 6' 2" (1 88 m)   Wt 104 kg (230 lb)   SpO2 98%   BMI 29 53 kg/m²   No LMP for male patient  Physical Exam     Physical Exam  Vitals and nursing note reviewed  Constitutional:       General: He is in acute distress  Appearance: He is ill-appearing, toxic-appearing and diaphoretic  HENT:      Head: Normocephalic and atraumatic  Cardiovascular:      Rate and Rhythm: Normal rate and regular rhythm  Heart sounds: Normal heart sounds, S1 normal and S2 normal  No murmur heard  No friction rub  No gallop  Pulmonary:      Effort: Pulmonary effort is normal  No tachypnea, bradypnea, accessory muscle usage, prolonged expiration, respiratory distress or retractions  Breath sounds: Normal breath sounds and air entry  No stridor or decreased air movement  No decreased breath sounds, wheezing, rhonchi or rales  Chest:      Chest wall: No tenderness     Abdominal:      General: Bowel sounds are normal  There is no distension  Palpations: Abdomen is soft  Comments: Right back pain vs R CVA pain per report from nurse when she roomed patient  Back not assessed since I was very concerned patient was going to pass out--Ice applied to back of neck, cool compress to forehead, stretcher wheeled to his exam room so patient could lie down  Musculoskeletal:         General: Normal range of motion  Cervical back: Neck supple  Skin:     General: Skin is moist       Coloration: Skin is ashen

## 2021-08-02 LAB — PROCALCITONIN SERPL-MCNC: 0.33 NG/ML

## 2021-08-03 NOTE — ED PROVIDER NOTES
History  Chief Complaint   Patient presents with    Flank Pain     right side to shoulder blades     52 yom presents to ED seeking eval for R flank pain and an episode of diaphoresis, pt believes he had a fever at home as well  He presented to an urgent care who advised him to come to ED for furtner eval  Hx of ckd  He denies n/v/d, chest pain sob  Or urinary symptoms  he is well appearing in NAD at time of eval      Allergies reviewed  Prior to Admission Medications   Prescriptions Last Dose Informant Patient Reported? Taking? buPROPion (WELLBUTRIN SR) 200 MG 12 hr tablet   Yes No   Sig: Take 200 mg by mouth   pantoprazole (PROTONIX) 20 mg tablet   Yes No   Sig: Take 20 mg by mouth daily   pantoprazole (PROTONIX) 40 mg tablet  Self Yes No   Sig: Take 40 mg by mouth daily   Patient not taking: Reported on 8/1/2021   sertraline (ZOLOFT) 50 mg tablet  Self Yes No   Sig: Take 50 mg by mouth   Patient not taking: Reported on 8/1/2021      Facility-Administered Medications: None       Past Medical History:   Diagnosis Date    Hiatal hernia        History reviewed  No pertinent surgical history  History reviewed  No pertinent family history  I have reviewed and agree with the history as documented  E-Cigarette/Vaping     E-Cigarette/Vaping Substances     Social History     Tobacco Use    Smoking status: Never Smoker    Smokeless tobacco: Never Used   Substance Use Topics    Alcohol use: Never    Drug use: Never       Review of Systems   Constitutional: Negative for chills, fatigue and fever  HENT: Negative for congestion, ear pain, rhinorrhea, sinus pressure, sneezing and sore throat  Eyes: Negative for pain, discharge and visual disturbance  Respiratory: Negative for cough, choking, chest tightness, shortness of breath and wheezing  Cardiovascular: Negative for chest pain and palpitations  Gastrointestinal: Negative for abdominal pain, constipation, diarrhea, nausea and vomiting  Genitourinary: Positive for flank pain  Negative for difficulty urinating, dysuria and hematuria  Musculoskeletal: Negative for arthralgias, back pain, gait problem, neck pain and neck stiffness  Skin: Negative for color change and rash  Neurological: Negative for dizziness, seizures, syncope, light-headedness and headaches  All other systems reviewed and are negative  Physical Exam  Physical Exam  Vitals and nursing note reviewed  Constitutional:       General: He is not in acute distress  Appearance: He is well-developed  He is not ill-appearing  HENT:      Head: Normocephalic and atraumatic  Right Ear: External ear normal       Left Ear: External ear normal       Nose: Nose normal    Eyes:      Pupils: Pupils are equal, round, and reactive to light  Cardiovascular:      Rate and Rhythm: Normal rate and regular rhythm  Heart sounds: Normal heart sounds  No murmur heard  No friction rub  No gallop  Pulmonary:      Effort: Pulmonary effort is normal  No respiratory distress  Breath sounds: Normal breath sounds  No stridor  No wheezing or rales  Abdominal:      General: Bowel sounds are normal  There is no distension  Palpations: Abdomen is soft  Tenderness: There is no abdominal tenderness  There is no guarding  Musculoskeletal:         General: No tenderness  Normal range of motion  Cervical back: Normal range of motion and neck supple  Skin:     General: Skin is warm  Capillary Refill: Capillary refill takes less than 2 seconds  Neurological:      Mental Status: He is alert and oriented to person, place, and time  Psychiatric:         Behavior: Behavior is cooperative           Vital Signs  ED Triage Vitals [08/01/21 1634]   Temperature Pulse Respirations Blood Pressure SpO2   99 9 °F (37 7 °C) 85 18 115/71 93 %      Temp Source Heart Rate Source Patient Position - Orthostatic VS BP Location FiO2 (%)   Oral -- Lying Left arm --      Pain Score       3           Vitals:    08/01/21 1745 08/01/21 1800 08/01/21 1815 08/01/21 1906   BP:    129/78   Pulse: 84 86 89 80   Patient Position - Orthostatic VS:             Visual Acuity      ED Medications  Medications   ondansetron (ZOFRAN) injection 4 mg (4 mg Intravenous Given 8/1/21 1729)   multi-electrolyte (ISOLYTE-S PH 7 4) bolus 1,000 mL (0 mL Intravenous Stopped 8/1/21 1830)   iohexol (OMNIPAQUE) 350 MG/ML injection (SINGLE-DOSE) 100 mL (100 mL Intravenous Given 8/1/21 1809)   dexamethasone (PF) (DECADRON) injection 10 mg (10 mg Intravenous Given 8/1/21 1902)       Diagnostic Studies  Results Reviewed     Procedure Component Value Units Date/Time    Blood culture #1 [[id number]] Collected: 08/01/21 1706    Lab Status: Preliminary result Specimen: Blood from Arm, Left Updated: 08/03/21 0001     Blood Culture No Growth at 24 hrs  Blood culture #2 [[id number]] Collected: 08/01/21 1706    Lab Status: Preliminary result Specimen: Blood from Arm, Left Updated: 08/03/21 0001     Blood Culture No Growth at 24 hrs  Procalcitonin with AM Reflex [[id number]]  (Abnormal) Collected: 08/01/21 1706    Lab Status: Final result Specimen: Blood from Arm, Left Updated: 08/02/21 0032     Procalcitonin 0 33 ng/ml     Novel Coronavirus (Covid-19),PCR [facility] - 2 Hour Stat [[id number]]  (Normal) Collected: 08/01/21 1854    Lab Status: Final result Specimen: Nares from Nose Updated: 08/01/21 1954     SARS-CoV-2 Negative    Narrative: The specimen collection materials, transport medium, and/or testing methodology utilized in the production of these test results have been proven to be reliable in a limited validation with an abbreviated program under the Emergency Utilization Authorization provided by the FDA  Testing reported as "Presumptive positive" will be confirmed with secondary testing to ensure result accuracy    Clinical caution and judgement should be used with the interpretation of these results with consideration of the clinical impression and other laboratory testing  Testing reported as "Positive" or "Negative" has been proven to be accurate according to standard laboratory validation requirements  All testing is performed with control materials showing appropriate reactivity at standard intervals  Comprehensive metabolic panel [[id number]]  (Abnormal) Collected: 08/01/21 1706    Lab Status: Final result Specimen: Blood from Arm, Left Updated: 08/01/21 1746     Sodium 137 mmol/L      Potassium 3 6 mmol/L      Chloride 101 mmol/L      CO2 24 mmol/L      ANION GAP 12 mmol/L      BUN 20 mg/dL      Creatinine 1 47 mg/dL      Glucose 158 mg/dL      Calcium 8 7 mg/dL      AST 29 U/L      ALT 37 U/L      Alkaline Phosphatase 60 U/L      Total Protein 6 9 g/dL      Albumin 4 4 g/dL      Total Bilirubin 0 90 mg/dL      eGFR 56 ml/min/1 73sq m     Narrative:      [place] guidelines for Chronic Kidney Disease (CKD):     Stage 1 with normal or high GFR (GFR > 90 mL/min/1 73 square meters)    Stage 2 Mild CKD (GFR = 60-89 mL/min/1 73 square meters)    Stage 3A Moderate CKD (GFR = 45-59 mL/min/1 73 square meters)    Stage 3B Moderate CKD (GFR = 30-44 mL/min/1 73 square meters)    Stage 4 Severe CKD (GFR = 15-29 mL/min/1 73 square meters)    Stage 5 End Stage CKD (GFR <15 mL/min/1 73 square meters)  Note: GFR calculation is accurate only with a steady state creatinine    Lactic acid [[id number]]  (Normal) Collected: 08/01/21 1706    Lab Status: Final result Specimen: Blood from Arm, Left Updated: 08/01/21 1744     LACTIC ACID 1 0 mmol/L     Narrative:      Result may be elevated if tourniquet was used during collection      Alveda Kos [[id number]]  (Normal) Collected: 08/01/21 1706    Lab Status: Final result Specimen: Blood from Arm, Left Updated: 08/01/21 1738     Protime 14 0 seconds      INR 1 09    APTT [[id number]]  (Normal) Collected: 08/01/21 1706    Lab Status: Final result Specimen: Blood from Arm, Left Updated: 08/01/21 1738     PTT 30 seconds     Smear Review(Phlebs Do Not Order) [[id number]]  (Abnormal) Collected: 08/01/21 1706    Lab Status: Final result Specimen: Blood from Arm, Left Updated: 08/01/21 1732     RBC Morphology Normal     Platelet Estimate Decreased    CBC and differential [[id number]]  (Abnormal) Collected: 08/01/21 1706    Lab Status: Final result Specimen: Blood from Arm, Left Updated: 08/01/21 1731     WBC 3 80 Thousand/uL      RBC 5 21 Million/uL      Hemoglobin 16 0 g/dL      Hematocrit 46 4 %      MCV 89 fL      MCH 30 7 pg      MCHC 34 5 g/dL      RDW 13 2 %      MPV 10 3 fL      Platelets 98 Thousands/uL      Neutrophils Relative 85 %      Lymphocytes Relative 8 %      Monocytes Relative 7 %      Eosinophils Relative 0 %      Basophils Relative 0 %      Neutrophils Absolute 3 20 Thousands/µL      Lymphocytes Absolute 0 30 Thousands/µL      Monocytes Absolute 0 30 Thousand/µL      Eosinophils Absolute 0 00 Thousand/µL      Basophils Absolute 0 00 Thousands/µL     UA w Reflex to Microscopic w Reflex to Culture [[id number]]  (Abnormal) Collected: 08/01/21 1645    Lab Status: Final result Specimen: Urine, Clean Catch Updated: 08/01/21 1651     Color, UA Yellow     Clarity, UA Clear     Specific Gravity, UA >=1 030     pH, UA 6 0     Leukocytes, UA Negative     Nitrite, UA Negative     Protein, UA Negative mg/dl      Glucose, UA Negative mg/dl      Ketones, UA 15 (1+) mg/dl      Urobilinogen, UA 0 2 E U /dl      Bilirubin, UA 1+     Blood, UA Negative                 CT abdomen pelvis with contrast   Final Result by Fallon Perrin MD (08/01 1832)      No acute pathology  Workstation performed: [id number]         XR chest portable   ED Interpretation by Kelly Ko PA-C (08/01 1735)   No acute osseous, cardiopulmonary or vascular abnormalities identified by me at this time        Final Result by Fallon Perrin MD (08/01 1827)      No acute cardiopulmonary disease  Workstation performed: [id number]                    Procedures  Procedures         ED Course                             SBIRT 22yo+      Most Recent Value   SBIRT (22 yo +)   In order to provide better care to our patients, we are screening all of our patients for alcohol and drug use  Would it be okay to ask you these screening questions? Yes Filed at: 08/01/2021 1905   Initial Alcohol Screen: US AUDIT-C    1  How often do you have a drink containing alcohol? 1 Filed at: 08/01/2021 1905   2  How many drinks containing alcohol do you have on a typical day you are drinking? 0 Filed at: 08/01/2021 1905   3a  Male UNDER 65: How often do you have five or more drinks on one occasion? 0 Filed at: 08/01/2021 1905   Audit-C Score  1 Filed at: 08/01/2021 1905   NARCISO: How many times in the past year have you    Used an illegal drug or used a prescription medication for non-medical reasons? Never Filed at: 08/01/2021 1905                    MDM  Number of Diagnoses or Management Options  Right low back pain  Diagnosis management comments: Benign exam  Pt reports pain has improved with treatment in ED and has been gradually improving on its own  Consider passed renal stone? Recommend close f/u with pcp  Return to ed with worsening concerns for symptoms  Pt agreeable to plan  No questions at time of DC          Amount and/or Complexity of Data Reviewed  Clinical lab tests: ordered and reviewed  Tests in the radiology section of CPT®: ordered and reviewed    Risk of Complications, Morbidity, and/or Mortality  Presenting problems: moderate  Diagnostic procedures: low  Management options: low    Patient Progress  Patient progress: stable      Disposition  Final diagnoses:   Right low back pain     Time reflects when diagnosis was documented in both MDM as applicable and the Disposition within this note     Time User Action Codes Description Comment    8/1/2021  6:53 PM Bradley Titus Roosevelt Trevett Add [M54 5] Right low back pain       ED Disposition     ED Disposition Condition Date/Time Comment    Discharge Stable Sun Aug 1, 2021  6:53 PM Fred Wei discharge to home/self care  Follow-up Information    None         Discharge Medication List as of 8/1/2021  6:56 PM      START taking these medications    Details   Lidocaine (Salonpas Pain Relieving) 4 % PTCH Apply 1 patch topically every 12 (twelve) hours, Starting Sun 8/1/2021, Normal      methocarbamol (ROBAXIN) 500 mg tablet Take 1 tablet (500 mg total) by mouth 2 (two) times a day, Starting Sun 8/1/2021, Normal         CONTINUE these medications which have NOT CHANGED    Details   buPROPion (WELLBUTRIN SR) 200 MG 12 hr tablet Take 200 mg by mouth, Starting Fri 3/12/2021, Historical Med      !! pantoprazole (PROTONIX) 20 mg tablet Take 20 mg by mouth daily, Starting Thu 7/22/2021, Historical Med      !! pantoprazole (PROTONIX) 40 mg tablet Take 40 mg by mouth daily, Starting Thu 3/12/2020, Historical Med      sertraline (ZOLOFT) 50 mg tablet Take 50 mg by mouth, Starting Thu 6/4/2020, Historical Med       !! - Potential duplicate medications found  Please discuss with provider  No discharge procedures on file      PDMP Review     None          ED Provider  Electronically Signed by           Leah Patel PA-C  08/03/21 2750

## 2021-08-05 LAB
ATRIAL RATE: 88 BPM
P AXIS: 62 DEGREES
PR INTERVAL: 190 MS
QRS AXIS: 53 DEGREES
QRSD INTERVAL: 88 MS
QT INTERVAL: 372 MS
QTC INTERVAL: 450 MS
T WAVE AXIS: 69 DEGREES
VENTRICULAR RATE: 88 BPM

## 2021-08-05 PROCEDURE — 93010 ELECTROCARDIOGRAM REPORT: CPT | Performed by: INTERNAL MEDICINE

## 2021-08-07 LAB
BACTERIA BLD CULT: NORMAL
BACTERIA BLD CULT: NORMAL

## 2021-11-12 ENCOUNTER — HOSPITAL ENCOUNTER (OUTPATIENT)
Dept: CT IMAGING | Facility: HOSPITAL | Age: 51
Discharge: HOME | End: 2021-11-12
Payer: COMMERCIAL

## 2021-11-12 DIAGNOSIS — R10.9 UNSPECIFIED ABDOMINAL PAIN: ICD-10-CM

## 2021-11-12 DIAGNOSIS — R39.198 OTHER DIFFICULTIES WITH MICTURITION: ICD-10-CM

## 2021-11-12 PROCEDURE — 74176 CT ABD & PELVIS W/O CONTRAST: CPT

## 2024-10-04 ENCOUNTER — HOSPITAL ENCOUNTER (OUTPATIENT)
Dept: NON INVASIVE DIAGNOSTICS | Facility: HOSPITAL | Age: 54
Discharge: HOME | End: 2024-10-04
Payer: COMMERCIAL

## 2024-10-04 DIAGNOSIS — I83.891 VARICOSE VEINS OF RIGHT LOWER EXTREMITY WITH OTHER COMPLICATIONS: ICD-10-CM

## 2024-10-04 DIAGNOSIS — I80.9 PHLEBITIS AND THROMBOPHLEBITIS OF UNSPECIFIED SITE: ICD-10-CM

## 2024-10-04 PROCEDURE — 93971 EXTREMITY STUDY: CPT | Performed by: SURGERY

## 2024-10-04 PROCEDURE — 93971 EXTREMITY STUDY: CPT

## 2025-05-11 ENCOUNTER — OFFICE VISIT (OUTPATIENT)
Dept: URGENT CARE | Facility: CLINIC | Age: 55
End: 2025-05-11
Payer: COMMERCIAL

## 2025-05-11 VITALS
TEMPERATURE: 98.4 F | OXYGEN SATURATION: 98 % | WEIGHT: 229 LBS | SYSTOLIC BLOOD PRESSURE: 138 MMHG | DIASTOLIC BLOOD PRESSURE: 56 MMHG | RESPIRATION RATE: 20 BRPM | HEIGHT: 74 IN | BODY MASS INDEX: 29.39 KG/M2 | HEART RATE: 98 BPM

## 2025-05-11 DIAGNOSIS — K08.89 PAIN, DENTAL: Primary | ICD-10-CM

## 2025-05-11 PROCEDURE — 99203 OFFICE O/P NEW LOW 30 MIN: CPT | Performed by: PHYSICIAN ASSISTANT

## 2025-05-11 RX ORDER — CLINDAMYCIN HYDROCHLORIDE 300 MG/1
300 CAPSULE ORAL 3 TIMES DAILY
Qty: 15 CAPSULE | Refills: 0 | Status: SHIPPED | OUTPATIENT
Start: 2025-05-11 | End: 2025-05-16

## 2025-05-11 RX ORDER — OMEPRAZOLE 20 MG/1
40 TABLET, DELAYED RELEASE ORAL
COMMUNITY

## 2025-05-11 RX ORDER — AMLODIPINE BESYLATE 2.5 MG/1
2.5 TABLET ORAL EVERY MORNING
COMMUNITY

## 2025-05-11 RX ORDER — BUPROPION HYDROCHLORIDE 300 MG/1
300 TABLET ORAL EVERY MORNING
COMMUNITY
Start: 2025-03-25

## 2025-05-11 RX ORDER — AMLODIPINE BESYLATE 5 MG/1
TABLET ORAL
COMMUNITY

## 2025-05-11 RX ORDER — POTASSIUM CITRATE 1080 MG/1
TABLET, EXTENDED RELEASE ORAL
COMMUNITY

## 2025-05-11 NOTE — PROGRESS NOTES
[facility]        NAME: Дмитрий Stallings is a 54 y.o. male  : 1970    MRN: [id number]  DATE: May 11, 2025  TIME: 1:19 PM    Assessment and Plan   Pain, dental [K08.89]  1. Pain, dental  clindamycin (CLEOCIN) 300 MG capsule            Patient Instructions     Patient Instructions   Take amoxicillin as prescribed for possible dental infection.  Discussed continuing mouthwash/warm salt water gargles to the area.  Can take over-the-counter ibuprofen or Tylenol for pain.  All patient's questions answered and is agreeable with this plan.      Follow up with PCP in 3-5 days.  Proceed to  ER if symptoms worsen.    Chief Complaint     Chief Complaint   Patient presents with    Oral Pain     Left lower gum pain from getting food stuck in gumline. Was recently at dentist and they did nothing for the issue.         History of Present Illness       Patient is a 54-year-old male presenting today with dental pain x 1 week.  Patient notes about a week ago he felt like he got a piece of food stuck in his gumline on the left lower side, states he used a dental tool he has at home to try to get the food out and believes he may have dug the instrument to deep, went to the dentist a couple days ago who evaluated the area but stated everything looked okay, notes he now has some redness and swelling of the area and is concerned of dental infection.  Denies fever, trouble swallowing, discharge or drainage.        Review of Systems   Review of Systems   Constitutional:  Negative for chills and fever.   HENT:  Positive for dental problem. Negative for ear pain and sore throat.    Eyes:  Negative for pain and visual disturbance.   Respiratory:  Negative for cough and shortness of breath.    Cardiovascular:  Negative for chest pain and palpitations.   Gastrointestinal:  Negative for abdominal pain and vomiting.   Genitourinary:  Negative for dysuria and hematuria.   Musculoskeletal:  Negative for arthralgias and back pain.    Skin:  Negative for color change and rash.   Neurological:  Negative for seizures and syncope.   All other systems reviewed and are negative.        Current Medications       Current Outpatient Medications:     amLODIPine (NORVASC) 2.5 mg tablet, Take 2.5 mg by mouth every morning, Disp: , Rfl:     buPROPion (WELLBUTRIN SR) 200 MG 12 hr tablet, Take 200 mg by mouth, Disp: , Rfl:     buPROPion (WELLBUTRIN XL) 300 mg 24 hr tablet, Take 300 mg by mouth every morning, Disp: , Rfl:     Cholecalciferol 50 MCG (2000 UT) CAPS, Take by mouth, Disp: , Rfl:     clindamycin (CLEOCIN) 300 MG capsule, Take 1 capsule (300 mg total) by mouth 3 (three) times a day for 5 days, Disp: 15 capsule, Rfl: 0    Lidocaine (Salonpas Pain Relieving) 4 % PTCH, Apply 1 patch topically every 12 (twelve) hours, Disp: 10 patch, Rfl: 0    methocarbamol (ROBAXIN) 500 mg tablet, Take 1 tablet (500 mg total) by mouth 2 (two) times a day, Disp: 20 tablet, Rfl: 0    Multiple Vitamins-Minerals (Centrum Silver 50+Men) TABS, Take by mouth, Disp: , Rfl:     omeprazole (PriLOSEC OTC) 20 MG tablet, Take 40 mg by mouth, Disp: , Rfl:     pantoprazole (PROTONIX) 20 mg tablet, Take 20 mg by mouth daily, Disp: , Rfl:     amLODIPine (NORVASC) 5 mg tablet, Take by mouth (Patient not taking: Reported on 5/11/2025), Disp: , Rfl:     pantoprazole (PROTONIX) 40 mg tablet, Take 40 mg by mouth daily (Patient not taking: Reported on 8/1/2021), Disp: , Rfl:     potassium citrate (UROCIT-K) 10 mEq, Take by mouth (Patient not taking: Reported on 5/11/2025), Disp: , Rfl:     sertraline (ZOLOFT) 50 mg tablet, Take 50 mg by mouth (Patient not taking: Reported on 8/1/2021), Disp: , Rfl:     Current Allergies     Allergies as of 05/11/2025 - Reviewed 05/11/2025   Allergen Reaction Noted    Penicillins Other (See Comments) 05/11/2025    Amoxicillin Hives 12/28/2020            The following portions of the patient's history were reviewed and updated as appropriate: allergies,  "current medications, past family history, past medical history, past social history, past surgical history and problem list.     Past Medical History:   Diagnosis Date    Hiatal hernia        History reviewed. No pertinent surgical history.    History reviewed. No pertinent family history.      Medications have been verified.        Objective   /56   Pulse 98   Temp 98.4 °F (36.9 °C)   Resp 20   Ht 6' 2\" (1.88 m)   Wt 104 kg (229 lb)   SpO2 98%   BMI 29.40 kg/m²        Physical Exam     Physical Exam  Vitals reviewed.   HENT:      Head: Normocephalic.      Comments: Redness and swelling around left mandibular molar, no fluctuance or induration appreciated upon palpation, no visible abscess/discharge or drainage, no swelling of floor of mouth, airway is patent and clear     Right Ear: External ear normal.      Left Ear: External ear normal.      Nose: Nose normal.      Mouth/Throat:      Mouth: Mucous membranes are moist.   Cardiovascular:      Rate and Rhythm: Normal rate.      Pulses: Normal pulses.   Pulmonary:      Effort: Pulmonary effort is normal.   Skin:     General: Skin is warm.      Capillary Refill: Capillary refill takes less than 2 seconds.   Neurological:      General: No focal deficit present.      Mental Status: He is alert.                   "

## 2025-05-11 NOTE — PATIENT INSTRUCTIONS
Take amoxicillin as prescribed for possible dental infection.  Discussed continuing mouthwash/warm salt water gargles to the area.  Can take over-the-counter ibuprofen or Tylenol for pain.  All patient's questions answered and is agreeable with this plan.